# Patient Record
Sex: FEMALE | Race: WHITE | NOT HISPANIC OR LATINO | URBAN - METROPOLITAN AREA
[De-identification: names, ages, dates, MRNs, and addresses within clinical notes are randomized per-mention and may not be internally consistent; named-entity substitution may affect disease eponyms.]

---

## 2024-09-03 ENCOUNTER — EMERGENCY (EMERGENCY)
Facility: HOSPITAL | Age: 19
LOS: 1 days | Discharge: HOME | End: 2024-09-03
Attending: EMERGENCY MEDICINE
Payer: COMMERCIAL

## 2024-09-03 VITALS
SYSTOLIC BLOOD PRESSURE: 153 MMHG | WEIGHT: 100.97 LBS | TEMPERATURE: 98 F | RESPIRATION RATE: 18 BRPM | HEART RATE: 78 BPM | OXYGEN SATURATION: 100 % | DIASTOLIC BLOOD PRESSURE: 97 MMHG | HEIGHT: 67 IN

## 2024-09-03 VITALS
OXYGEN SATURATION: 98 % | SYSTOLIC BLOOD PRESSURE: 132 MMHG | RESPIRATION RATE: 18 BRPM | TEMPERATURE: 98 F | HEART RATE: 78 BPM | DIASTOLIC BLOOD PRESSURE: 80 MMHG

## 2024-09-03 LAB
ANISOCYTOSIS BLD QL: SLIGHT — SIGNIFICANT CHANGE UP
BASOPHILS # BLD AUTO: 0 K/UL — SIGNIFICANT CHANGE UP (ref 0–0.2)
BASOPHILS NFR BLD AUTO: 0 % — SIGNIFICANT CHANGE UP (ref 0–2)
EOSINOPHIL # BLD AUTO: 0 K/UL — SIGNIFICANT CHANGE UP (ref 0–0.5)
EOSINOPHIL NFR BLD AUTO: 0 % — SIGNIFICANT CHANGE UP (ref 0–6)
FERRITIN SERPL-MCNC: 5 NG/ML — LOW (ref 15–150)
GIANT PLATELETS BLD QL SMEAR: PRESENT — SIGNIFICANT CHANGE UP
HCG SERPL-ACNC: <4 MIU/ML — SIGNIFICANT CHANGE UP
HCT VFR BLD CALC: 29.9 % — LOW (ref 34.5–45)
HGB BLD-MCNC: 8.5 G/DL — LOW (ref 11.5–15.5)
HIV 1 & 2 AB SERPL IA.RAPID: SIGNIFICANT CHANGE UP
IRON SATN MFR SERPL: 14 UG/DL — LOW (ref 37–145)
IRON SATN MFR SERPL: 3 % — LOW (ref 14–50)
LYMPHOCYTES # BLD AUTO: 1.78 K/UL — SIGNIFICANT CHANGE UP (ref 1–3.3)
LYMPHOCYTES # BLD AUTO: 33.3 % — SIGNIFICANT CHANGE UP (ref 13–44)
MANUAL SMEAR VERIFICATION: SIGNIFICANT CHANGE UP
MCHC RBC-ENTMCNC: 20.5 PG — LOW (ref 27–34)
MCHC RBC-ENTMCNC: 28.4 GM/DL — LOW (ref 32–36)
MCV RBC AUTO: 72 FL — LOW (ref 80–100)
MICROCYTES BLD QL: SLIGHT — SIGNIFICANT CHANGE UP
MONOCYTES # BLD AUTO: 0.37 K/UL — SIGNIFICANT CHANGE UP (ref 0–0.9)
MONOCYTES NFR BLD AUTO: 7 % — SIGNIFICANT CHANGE UP (ref 2–14)
NEUTROPHILS # BLD AUTO: 3.19 K/UL — SIGNIFICANT CHANGE UP (ref 1.8–7.4)
NEUTROPHILS NFR BLD AUTO: 59.7 % — SIGNIFICANT CHANGE UP (ref 43–77)
PLAT MORPH BLD: NORMAL — SIGNIFICANT CHANGE UP
PLATELET # BLD AUTO: 398 K/UL — SIGNIFICANT CHANGE UP (ref 150–400)
POIKILOCYTOSIS BLD QL AUTO: SLIGHT — SIGNIFICANT CHANGE UP
POLYCHROMASIA BLD QL SMEAR: SLIGHT — SIGNIFICANT CHANGE UP
RBC # BLD: 4.15 M/UL — SIGNIFICANT CHANGE UP (ref 3.8–5.2)
RBC # FLD: 22.5 % — HIGH (ref 10.3–14.5)
RBC BLD AUTO: ABNORMAL
TIBC SERPL-MCNC: 495 UG/DL — HIGH (ref 220–430)
TRANSFERRIN SERPL-MCNC: 346 MG/DL — SIGNIFICANT CHANGE UP (ref 192–382)
WBC # BLD: 5.34 K/UL — SIGNIFICANT CHANGE UP (ref 3.8–10.5)
WBC # FLD AUTO: 5.34 K/UL — SIGNIFICANT CHANGE UP (ref 3.8–10.5)

## 2024-09-03 PROCEDURE — 83540 ASSAY OF IRON: CPT

## 2024-09-03 PROCEDURE — 36415 COLL VENOUS BLD VENIPUNCTURE: CPT

## 2024-09-03 PROCEDURE — 86703 HIV-1/HIV-2 1 RESULT ANTBDY: CPT

## 2024-09-03 PROCEDURE — 84466 ASSAY OF TRANSFERRIN: CPT

## 2024-09-03 PROCEDURE — 82728 ASSAY OF FERRITIN: CPT

## 2024-09-03 PROCEDURE — 86803 HEPATITIS C AB TEST: CPT

## 2024-09-03 PROCEDURE — 96374 THER/PROPH/DIAG INJ IV PUSH: CPT

## 2024-09-03 PROCEDURE — 84702 CHORIONIC GONADOTROPIN TEST: CPT

## 2024-09-03 PROCEDURE — 99284 EMERGENCY DEPT VISIT MOD MDM: CPT

## 2024-09-03 PROCEDURE — 99284 EMERGENCY DEPT VISIT MOD MDM: CPT | Mod: 25

## 2024-09-03 PROCEDURE — 83550 IRON BINDING TEST: CPT

## 2024-09-03 PROCEDURE — 80053 COMPREHEN METABOLIC PANEL: CPT

## 2024-09-03 PROCEDURE — 85025 COMPLETE CBC W/AUTO DIFF WBC: CPT

## 2024-09-03 RX ORDER — IRON SUCROSE 20 MG/ML
100 INJECTION, SOLUTION INTRAVENOUS ONCE
Refills: 0 | Status: COMPLETED | OUTPATIENT
Start: 2024-09-03 | End: 2024-09-03

## 2024-09-03 RX ADMIN — IRON SUCROSE 100 MILLIGRAM(S): 20 INJECTION, SOLUTION INTRAVENOUS at 20:38

## 2024-09-03 NOTE — ED PROVIDER NOTE - NSTOBACCO TYPE_GEN_A_CORE_RD
Oxybutynin Counseling:  I discussed with the patient the risks of oxybutynin including but not limited to skin rash, drowsiness, dry mouth, difficulty urinating, and blurred vision. Vaping/e-Cigarettes

## 2024-09-03 NOTE — ED PROVIDER NOTE - NSFOLLOWUPINSTRUCTIONS_ED_ALL_ED_FT
- Please continue to follow up as outpatient with your hematologist.   - Please bring all documentation from your ED visit to any related future follow up appointment.  - Please call to schedule follow up appointment with your primary care physician within 24-48 hours.  - Please seek immediate medical attention or return to the ED for any new/worsening, signs/symptoms, or concerns.    Feel better!       Anemia       Anemia is a condition in which there is not enough red blood cells or hemoglobin in the blood. Hemoglobin is a substance in red blood cells that carries oxygen.    When you do not have enough red blood cells or hemoglobin (are anemic), your body cannot get enough oxygen and your organs may not work properly. As a result, you may feel very tired or have other problems.      What are the causes?    Common causes of anemia include:  •Excessive bleeding. Anemia can be caused by excessive bleeding inside or outside the body, including bleeding from the intestines or from heavy menstrual periods in females.      •Poor nutrition.      •Long-lasting (chronic) kidney, thyroid, and liver disease.      •Bone marrow disorders, spleen problems, and blood disorders.      •Cancer and treatments for cancer.      •HIV (human immunodeficiency virus) and AIDS (acquired immunodeficiency syndrome).      •Infections, medicines, and autoimmune disorders that destroy red blood cells.        What are the signs or symptoms?    Symptoms of this condition include:  •Minor weakness.      •Dizziness.      •Headache, or difficulties concentrating and sleeping.      •Heartbeats that feel irregular or faster than normal (palpitations).      •Shortness of breath, especially with exercise.      •Pale skin, lips, and nails, or cold hands and feet.      •Indigestion and nausea.      Symptoms may occur suddenly or develop slowly. If your anemia is mild, you may not have symptoms.      How is this diagnosed?    This condition is diagnosed based on blood tests, your medical history, and a physical exam. In some cases, a test may be needed in which cells are removed from the soft tissue inside of a bone and looked at under a microscope (bone marrow biopsy). Your health care provider may also check your stool (feces) for blood and may do additional testing to look for the cause of your bleeding.    Other tests may include:  •Imaging tests, such as a CT scan or MRI.      •A procedure to see inside your esophagus and stomach (endoscopy).      •A procedure to see inside your colon and rectum (colonoscopy).        How is this treated?    Treatment for this condition depends on the cause. If you continue to lose a lot of blood, you may need to be treated at a hospital. Treatment may include:  •Taking supplements of iron, vitamin B12, or folic acid.      •Taking a hormone medicine (erythropoietin) that can help to stimulate red blood cell growth.      •Having a blood transfusion. This may be needed if you lose a lot of blood.      •Making changes to your diet.      •Having surgery to remove your spleen.        Follow these instructions at home:    •Take over-the-counter and prescription medicines only as told by your health care provider.      •Take supplements only as told by your health care provider.      •Follow any diet instructions that you were given by your health care provider.      •Keep all follow-up visits as told by your health care provider. This is important.        Contact a health care provider if:    •You develop new bleeding anywhere in the body.        Get help right away if:    •You are very weak.      •You are short of breath.      •You have pain in your abdomen or chest.      •You are dizzy or feel faint.      •You have trouble concentrating.      •You have bloody stools, black stools, or tarry stools.      •You vomit repeatedly or you vomit up blood.      These symptoms may represent a serious problem that is an emergency. Do not wait to see if the symptoms will go away. Get medical help right away. Call your local emergency services (911 in the [place] Do not drive yourself to the hospital.       Summary    •Anemia is a condition in which you do not have enough red blood cells or enough of a substance in your red blood cells that carries oxygen (hemoglobin).      •Symptoms may occur suddenly or develop slowly.      •If your anemia is mild, you may not have symptoms.      •This condition is diagnosed with blood tests, a medical history, and a physical exam. Other tests may be needed.      •Treatment for this condition depends on the cause of the anemia.      This information is not intended to replace advice given to you by your health care provider. Make sure you discuss any questions you have with your health care provider.

## 2024-09-03 NOTE — ED ADULT NURSE NOTE - NSFALLUNIVINTERV_ED_ALL_ED
Bed/Stretcher in lowest position, wheels locked, appropriate side rails in place/Call bell, personal items and telephone in reach/Instruct patient to call for assistance before getting out of bed/chair/stretcher/Non-slip footwear applied when patient is off stretcher/[place] to call system/Physically safe environment - no spills, clutter or unnecessary equipment/Purposeful proactive rounding/Room/bathroom lighting operational, light cord in reach

## 2024-09-03 NOTE — ED ADULT NURSE NOTE - OBJECTIVE STATEMENT
pt c/o generalized weakness. denies sob. rr even and unlabored. pt educated on plan of care, pt able to successfully teach back plan of care to RN, RN will continue to reeducate pt during hospital stay.

## 2024-09-03 NOTE — ED ADULT TRIAGE NOTE - CHIEF COMPLAINT QUOTE
pt c.o of tired, weakness and fatigue x"weeks" . pt states she was told she had low iron and anemia. pt very anxious in triage. "I feel like Im dying"

## 2024-09-03 NOTE — ED PROVIDER NOTE - PROGRESS NOTE DETAILS
AVRIL Colin: Patient evaluated by intake physician. HPI/ROS/PE as noted above. Will follow up plan per intake physician and continue to assess patient. AVRIL Colin: Results discussed with patient. Baseline hemoglobin 8.5. States she was supposed ot have iron transfusion as outpatient, but was canceled 2/2 insurance. Follows with hematologist. Will give push in ED, monitor and DC. AVRIL Colin: Results discussed with patient. Baseline hemoglobin 8.5. States she was supposed ot have iron transfusion as outpatient, but was canceled 2/2 insurance. Follows with hematologist. Will give in ED, monitor and DC.

## 2024-09-03 NOTE — ED PROVIDER NOTE - PATIENT PORTAL LINK FT
You can access the FollowMyHealth Patient Portal offered by [facility] by registering at the following website: [email or web address]. By joining [facility]’s FollowMyHealth portal, you will also be able to view your health information using other applications (apps) compatible with our system.

## 2024-09-03 NOTE — ED PROVIDER NOTE - ATTENDING APP SHARED VISIT CONTRIBUTION OF CARE
I, Karlo Del Rio MD, performed the initial face to face bedside interview with this patient regarding history of present illness, review of symptoms and relevant past medical, social and family history.  I completed an independent physical examination.  I was the initial provider who evaluated this patient. I have signed out the follow up of any pending tests (i.e. labs, radiological studies) to the ACP.  I have communicated the patient’s plan of care and disposition with the ACP.

## 2024-09-04 LAB
HCV AB S/CO SERPL IA: 0.19 S/CO — SIGNIFICANT CHANGE UP (ref 0–0.99)
HCV AB SERPL-IMP: SIGNIFICANT CHANGE UP

## 2025-01-16 ENCOUNTER — EMERGENCY (EMERGENCY)
Facility: HOSPITAL | Age: 20
LOS: 1 days | Discharge: HOME | End: 2025-01-16
Attending: EMERGENCY MEDICINE
Payer: COMMERCIAL

## 2025-01-16 VITALS
RESPIRATION RATE: 16 BRPM | SYSTOLIC BLOOD PRESSURE: 137 MMHG | DIASTOLIC BLOOD PRESSURE: 92 MMHG | WEIGHT: 115.08 LBS | TEMPERATURE: 100 F | HEART RATE: 107 BPM

## 2025-01-16 PROBLEM — D50.9 IRON DEFICIENCY ANEMIA, UNSPECIFIED: Chronic | Status: ACTIVE | Noted: 2024-09-03

## 2025-01-16 PROCEDURE — 56420 I&D BARTHOLINS GLAND ABSCESS: CPT

## 2025-01-16 PROCEDURE — 87075 CULTR BACTERIA EXCEPT BLOOD: CPT

## 2025-01-16 PROCEDURE — 99284 EMERGENCY DEPT VISIT MOD MDM: CPT

## 2025-01-16 PROCEDURE — 87070 CULTURE OTHR SPECIMN AEROBIC: CPT

## 2025-01-16 PROCEDURE — 99284 EMERGENCY DEPT VISIT MOD MDM: CPT | Mod: 25

## 2025-01-16 PROCEDURE — 87077 CULTURE AEROBIC IDENTIFY: CPT

## 2025-01-16 RX ORDER — LIDOCAINE HYDROCHLORIDE 10 MG/ML
5 INJECTION INFILTRATION; PERINEURAL ONCE
Refills: 0 | Status: ACTIVE | OUTPATIENT
Start: 2025-01-16 | End: 2025-01-16

## 2025-01-16 NOTE — ED PROVIDER NOTE - ATTENDING APP SHARED VISIT CONTRIBUTION OF CARE
Patient seen with ALANNAH BANDA.  Here with abscess/cyst.  Otherwise baseline.  No prior.  Mother also present.  Gyn consulted and drained abscess.  will treat.  Non toxic.  Well appearing. Uneventful ED observation period. Discussed signs and symptoms and reasons for return with good teachback.

## 2025-01-16 NOTE — ED PROVIDER NOTE - NSFOLLOWUPINSTRUCTIONS_ED_ALL_ED_FT
Follow up with GYN.  Take antibiotic as prescribed.  Come back with new or worsening symptoms.  Sitx baths at home.  Come back with new or worsening symptoms.

## 2025-01-16 NOTE — ED PROVIDER NOTE - OBJECTIVE STATEMENT
Pt is a 19y F with no PMH presenting for L sided bartholin cyst. Pt denies ever having this before. Pt noticed it 4 days ago and it grew significantly. She tried hot showers without relief. Pt also took some alleve. She went to urgent care yesterday and was prescribed bactrim and to f/u with GYN. Pt does not have a gyn and made an appt for february 4th. She states pain is too great to wait. She denies any drainage fever or urinary symptoms. Pt is sexually active with one male partner but is declining STI testing.  no other complaints.

## 2025-01-16 NOTE — PROCEDURE NOTE - ADDITIONAL PROCEDURE DETAILS
Skin prepped with betadine, 2% lidocaine injected for local anesthetic, skin prepped again with betadine. Small labial incision made at 4oclock with drainage of purulent fluid. loculations Broken up with etienne clamp and remaining fluid expressed manually. Patient tolerated the procedure well.

## 2025-01-16 NOTE — ED PROVIDER NOTE - PATIENT PORTAL LINK FT
You can access the FollowMyHealth Patient Portal offered by [facility] by registering at the following website: [email or web address]. By joining [facility]’s FollowMyHealth portal, you will also be able to view your health information using other applications (apps) compatible with our system.

## 2025-01-16 NOTE — ED ADULT NURSE NOTE - AVIAN FLU SYMPTOMS
----- Message from Kimberley Santoyo NP sent at 6/12/2020 12:33 PM CDT -----  Please call and inform patient her recent breast imaging results were within normal limits.   No

## 2025-01-16 NOTE — ED PROVIDER NOTE - CLINICAL SUMMARY MEDICAL DECISION MAKING FREE TEXT BOX
Pt is a 19y F with no PMH presenting for L sided bartholin cyst. Pt denies ever having this before. Pt noticed it 4 days ago and it grew significantly. She tried hot showers without relief. Pt also took some alleve. She went to urgent care yesterday and was prescribed bactrim and to f/u with GYN. Pt does not have a gyn and made an appt for february 4th. She states pain is too great to wait. She denies any drainage fever or urinary symptoms. Pt is sexually active with one male partner but is declining STI testing.  no other complaints.  pt with vaginal cyst. GYN called tro drain.  Will dc with keflex.

## 2025-01-16 NOTE — PROCEDURE NOTE - SUPERVISORY STATEMENT
Pt underwent an uncomplicated I+D of bartholin's gland abscess.  Procedure well tolerated with excellent hemostasis noted at the conclusion of the procedure.  Pt to start one week of keflex postprocedure.  Also advised to do sitz baths daily  Pt is scheduled to see gyn in early Feb.  Advised pt to call to move up her appointment.

## 2025-01-16 NOTE — CONSULT NOTE ADULT - ASSESSMENT
A/P: 19y G0 who presents to ED for with complaints of vulvar pain x3 days, found to have Bartholin gland abscess.     Plan: I&D with keflex x7 days, follow up with gyn provider in 1 week     D/w Dr. Wan

## 2025-01-16 NOTE — CONSULT NOTE ADULT - SUBJECTIVE AND OBJECTIVE BOX
EDUARD CARTER is a 19y G0 who presents to ED for with complaints of vulvar pain. GYN was consulted for concern for Bartholin abscess. Patient reports pain and L labial swelling started 3-4 days ago and has become larger and increasingly painful over time. She was seen at an urgent care facility yesterday and was given an antibiotic but came to the ED due to unbearable pain. States she can't sit or walk d/t pain. She denies any recent shaving to the area. Denies any drainage of fluid from the area. She denies N/V/F/C. Reports a decreased appetite which she attributes to the pain. Denies additional complaints.     OB: G0  Gyn: denies hx fibroids/cysts, denies hx sti/std  PMH: denies   PSH: denies   Med: denies   All: NKDA     Vitals:   T(C): 37.7 (01-16-25 @ 14:49), Max: 37.7 (01-16-25 @ 14:49)  HR: 107 (01-16-25 @ 14:49) (107 - 107)  BP: 137/92 (01-16-25 @ 14:49) (137/92 - 137/92)  RR: 16 (01-16-25 @ 14:49) (16 - 16)  SpO2: --    General: AAOx3, NAD  Abd: Soft, nontender, non distended; no rebound/guarding  Genital: L     Labs:              Radiology:      A/P:     D/w** EDUARD CARTER is a 19y G0 who presents to ED for with complaints of vulvar pain. GYN was consulted for concern for Bartholin abscess. Patient reports pain and L labial swelling started 3-4 days ago and has become larger and increasingly painful over time. She was seen at an urgent care facility yesterday and was given an antibiotic but came to the ED due to unbearable pain. States she can't sit or walk d/t pain. She denies any recent shaving to the area. Denies any drainage of fluid from the area. She denies N/V/F/C. Reports a decreased appetite which she attributes to the pain. Denies additional complaints.     Outpatient GYN: scheduled to establish care for 2/4/25, unsure provider name     OB: G0  Gyn: denies hx fibroids/cysts, denies hx sti/std  PMH: denies   PSH: denies   Med: denies   All: NKDA     Vitals:   T(C): 37.7 (01-16-25 @ 14:49), Max: 37.7 (01-16-25 @ 14:49)  HR: 107 (01-16-25 @ 14:49) (107 - 107)  BP: 137/92 (01-16-25 @ 14:49) (137/92 - 137/92)  RR: 16 (01-16-25 @ 14:49) (16 - 16)  SpO2: --    General: AAOx3, NAD  Abd: Soft, nontender, non distended; no rebound/guarding  Genital: L labial abscess @ 4oclock, erythematous, fluctuant, warm/tender to touch

## 2025-01-16 NOTE — CONSULT NOTE ADULT - ATTENDING COMMENTS
Agree with above assessment and plan.  Pt is a  19y G0 who presents to ED for with complaints of vulvar pain x3 days, found to have Bartholin gland abscess.  Recommend incision and drainage of Bartholin's gland abscess.  Pt is agreeable to recommendation.  Will proceed to do I+D as above  Please see procedure note for details.    ROBERT Reese

## 2025-01-17 LAB
GRAM STN FLD: SIGNIFICANT CHANGE UP
SPECIMEN SOURCE: SIGNIFICANT CHANGE UP

## 2025-01-19 LAB
CULTURE RESULTS: ABNORMAL
GRAM STN FLD: ABNORMAL
SPECIMEN SOURCE: SIGNIFICANT CHANGE UP

## 2025-01-20 LAB — CULTURE RESULTS: ABNORMAL

## 2025-01-21 ENCOUNTER — EMERGENCY (EMERGENCY)
Facility: HOSPITAL | Age: 20
LOS: 1 days | Discharge: HOME | End: 2025-01-21
Attending: STUDENT IN AN ORGANIZED HEALTH CARE EDUCATION/TRAINING PROGRAM
Payer: COMMERCIAL

## 2025-01-21 VITALS
OXYGEN SATURATION: 99 % | SYSTOLIC BLOOD PRESSURE: 124 MMHG | DIASTOLIC BLOOD PRESSURE: 83 MMHG | TEMPERATURE: 99 F | HEIGHT: 63 IN | HEART RATE: 104 BPM | RESPIRATION RATE: 16 BRPM | WEIGHT: 102.29 LBS

## 2025-01-21 PROCEDURE — 99283 EMERGENCY DEPT VISIT LOW MDM: CPT | Mod: 25

## 2025-01-21 PROCEDURE — 99283 EMERGENCY DEPT VISIT LOW MDM: CPT

## 2025-01-21 PROCEDURE — 96372 THER/PROPH/DIAG INJ SC/IM: CPT

## 2025-01-21 RX ORDER — METRONIDAZOLE 250 MG/1
500 TABLET ORAL ONCE
Refills: 0 | Status: COMPLETED | OUTPATIENT
Start: 2025-01-21 | End: 2025-01-21

## 2025-01-21 RX ORDER — CEFTRIAXONE SODIUM 1 G/1
500 INJECTION, POWDER, FOR SOLUTION INTRAMUSCULAR; INTRAVENOUS ONCE
Refills: 0 | Status: COMPLETED | OUTPATIENT
Start: 2025-01-21 | End: 2025-01-21

## 2025-01-21 RX ORDER — METRONIDAZOLE 250 MG/1
1 TABLET ORAL
Qty: 20 | Refills: 0
Start: 2025-01-21 | End: 2025-01-30

## 2025-01-21 RX ADMIN — CEFTRIAXONE SODIUM 500 MILLIGRAM(S): 1 INJECTION, POWDER, FOR SOLUTION INTRAMUSCULAR; INTRAVENOUS at 17:55

## 2025-01-21 RX ADMIN — METRONIDAZOLE 500 MILLIGRAM(S): 250 TABLET ORAL at 17:54

## 2025-01-21 NOTE — ED PROVIDER NOTE - PATIENT PORTAL LINK FT
You can access the FollowMyHealth Patient Portal offered by [facility] by registering at the following website: [email or web address]. By joining [facility]’s FollowMyHealth portal, you will also be able to view your health information using other applications (apps) compatible with our system.

## 2025-01-21 NOTE — ED PROVIDER NOTE - CARE PLAN
Principal Discharge DX:	Neisseria gonorrheae  Secondary Diagnosis:	Gardnerella vaginalis infection   1

## 2025-01-21 NOTE — ED PROVIDER NOTE - OBJECTIVE STATEMENT
20yo female with pmh of bartholin abscess s/p I&D 1/16 presents for STI treatment. Pt states she was called to come back get treatment. Pt denies fevers/chills, ha, loc, focal neuro deficits, cp/sob/palp, cough, abd pain/n/v/d, urinary symptoms, recent travel, vaginal discharge. Pt is sexually active with one male partner.

## 2025-01-21 NOTE — ED PROVIDER NOTE - NSFOLLOWUPINSTRUCTIONS_ED_ALL_ED_FT
Bacterial Vaginosis       Bacterial vaginosis is an infection that occurs when the normal balance of bacteria in the vagina changes. This change is caused by an overgrowth of certain bacteria in the vagina. Bacterial vaginosis is the most common vaginal infection among females ages 15–44.    This condition increases the risk of sexually transmitted infections (STIs). Treatment can help reduce this risk. Treatment is very important for pregnant women because this condition can cause babies to be born early (prematurely) or at a low birth weight.    What are the causes?  This condition is caused by an increase in harmful bacteria that is normally present in small amounts in the vagina. However, the exact reason this condition develops is not known.    You cannot get bacterial vaginosis from toilet seats, bedding, swimming pools, or contact with objects around you.    What increases the risk?  The following factors may make you more likely to develop this condition:    Having a new sexual partner or multiple sexual partners.  Having unprotected sex.  Douching.  Having an intrauterine device (IUD).  Smoking.  Abusing drugs and alcohol. This may lead to riskier sexual behavior.  Taking certain antibiotic medicines.  Being pregnant.    What are the signs or symptoms?  Some women with this condition have no symptoms. Symptoms may include:    Gray or white vaginal discharge. The discharge can be watery or foamy.  A fish-like odor with discharge, especially after sexual intercourse or during menstruation.  Itching in and around the vagina.  Burning or pain with urination.    How is this diagnosed?  This condition is diagnosed based on:    Your medical history.  A physical exam of the vagina.  Checking a sample of vaginal fluid for harmful bacteria or abnormal cells.    How is this treated?  This condition is treated with antibiotic medicines. These may be given as a pill, a vaginal cream, or a medicine that is put into the vagina (suppository). If the condition comes back after treatment, a second round of antibiotics may be needed.    Follow these instructions at home:      Medicines    Take or apply over-the-counter and prescription medicines only as told by your health care provider.  Take or apply your antibiotic medicine as told by your health care provider. Do not stop using the antibiotic even if you start to feel better.        General instructions    If you have a female sexual partner, tell her that you have a vaginal infection. She should follow up with her health care provider. If you have a male sexual partner, he does not need treatment.  Avoid sexual activity until you finish treatment.  Drink enough fluid to keep your urine pale yellow.  Keep the area around your vagina and rectum clean.    Wash the area daily with warm water.  Wipe yourself from front to back after using the toilet.  If you are breastfeeding, talk to your health care provider about continuing breastfeeding during treatment.  Keep all follow-up visits as told by your health care provider. This is important.    How is this prevented?      Self-care    Do not douche.  Wash the outside of your vagina with warm water only.  Wear cotton or cotton-lined underwear.  Avoid wearing tight pants and pantyhose, especially during the summer.        Safe sex    Use protection when having sex. This includes:    Latex condoms.  Dental dams. This is a layer of latex that protects the mouth during oral sex.  Limit the number of sexual partners. To help prevent bacterial vaginosis, it is best to have sex with just one partner (monogamous relationship).  Make sure you and your sexual partner are tested for STIs.        Drugs and alcohol    Do not use any products that contain nicotine or tobacco, such as cigarettes, e-cigarettes, and chewing tobacco. If you need help quitting, ask your health care provider.  Do not use illegal drugs.  Do not drink alcohol if:    Your health care provider tells you not to do this.  You are pregnant, may be pregnant, or are planning to become pregnant.  If you drink alcohol:    Limit how much you have to 0–1 drink a day.   Be aware of how much alcohol is in your drink. In the [place] one drink equals one 12 oz bottle of beer (355 mL), one 5 oz glass of wine (148 mL), or one 1½ oz glass of hard liquor (44 mL).    Where to find more information  Centers for Disease Control and Prevention: www.cdc.gov  American Sexual Health Association (DANYA): www.ashastd.org  [place] Department of Health and Human Services, Office on Women's Health: www.womenshealth.gov    Contact a health care provider if:  Your symptoms do not improve, even after treatment.  You have more discharge or pain when urinating.  You have a fever.  You have pain in your abdomen.  You have pain during sex.  You have vaginal bleeding between periods.    Summary  Bacterial vaginosis is a vaginal infection that occurs when the normal balance of bacteria in the vagina changes. It results from an overgrowth of certain bacteria.  This condition increases the risk of STIs (sexually transmitted infections). Getting treated can help reduce this risk.  Treatment is very important for pregnant women because this condition can cause babies to be born early (prematurely) or at low birth weight.  This condition is treated with antibiotic medicines. These may be given as a pill, a vaginal cream, or a medicine that is put into the vagina (suppository).        Gonorrhea    Gonorrhea is an STI (sexually transmitted infection) that can infect both men and women. If left untreated, this infection can:    Damage the female or male reproductive organs.  Cause women and men to be unable to have children (infertility).  Harm an unborn baby if an infected woman is pregnant.    It is important to get treatment for gonorrhea as soon as possible. All of your sex partners may also need to be treated for the infection.    What are the causes?  This condition is caused by bacteria called Neisseria gonorrhoeae. The infection is spread from person to person through sexual contact, including oral, anal, and vaginal sex. A  can get the infection from his or her mother during birth.    What increases the risk?  The following factors may make you more likely to develop this condition:    Being a woman who is younger than age 25 and who is sexually active.  Being a man who is bell or bisexual and who has sex with men.  Having a new sex partner or having multiple partners.  Having a sex partner who has an STI.  Not using condoms correctly or not using condoms every time you have sex.  Having a history of STIs.  Exchanging sex for money or drugs.    What are the signs or symptoms?  When symptoms occur, they may be different for women and men. Symptoms may include:    Abnormal discharge from the penis or vagina. The discharge may be cloudy, thick, or yellow-green in color.  Pain or burning when you urinate.  Irritation, pain, bleeding, or discharge from the rectum. This may occur if the infection was spread by anal sex.  Sore throat or swollen lymph nodes in the neck. This may occur if the infection was spread by oral sex.  Pain or swelling in the testicles, in men.  Bleeding between menstrual periods, in women.    In some cases, there are no symptoms.    How is this diagnosed?  This condition is diagnosed based on:    A physical exam.  A sample of discharge that is looked at under a microscope to find any bacteria. The discharge may be taken from the penis, vagina, throat, or rectum.  Urine tests.    Not all test results will be available during your visit.    How is this treated?  This condition is treated with antibiotic medicines. It is important to start treatment as soon as possible. Early treatment may prevent some problems from developing. You should not have sex during treatment. All types of sexual activity should be avoided for at least 7 days after treatment is complete and until your sex partner or partners have been treated.    Follow these instructions at home:  Take over-the-counter and prescription medicines as told by your health care provider. Finish all antibiotic medicine even when you start to feel better.  Do not have sex during treatment.  Do not have sex until at least 7 days after you and your partner or partners have finished treatment and your health care provider says it is okay.  It is up to you to get your test results. Ask your health care provider, or the department that is doing the test, when your results will be ready.  If you get a positive result on your gonorrhea test, tell your recent sex partners. These include any partners for oral, anal, or vaginal sex. They need to be checked for gonorrhea even if they do not have symptoms. They may need treatment, even if they get negative results on their gonorrhea tests.  Drink enough fluid to keep your urine pale yellow.  Keep all follow-up visits as told by your health care provider. This is important.    How is this prevented?     Use latex condoms correctly every time you have sex.  Ask if your sex partner or partners have been tested for STIs and had negative results.  Avoid having multiple sex partners.  Get regular health screenings to check for STIs.    Contact a health care provider if:  Your symptoms do not get better after a few days of taking antibiotics.  Your symptoms get worse.  You develop new symptoms, including:    Eye irritation.  Joint pain or swelling.  Unusual rashes.  You have a fever.    Summary  Gonorrhea is an STI (sexually transmitted infection) that can infect both men and women.  This infection is spread from person to person through sexual contact, including oral, anal, and vaginal sex. A  can get the infection from his or her mother during birth.  Symptoms include abnormal discharge, pain or burning while urinating, or pain in the rectum.  This condition is treated with antibiotic medicines. Do not have sex until at least 7 days after both you and any sex partners have completed antibiotic treatment.  Tell your health care provider if your symptoms get worse or you have new symptoms.    ADDITIONAL NOTES AND INSTRUCTIONS    Please follow up with your Primary MD in 24-48 hr.  Seek immediate medical care for any new/worsening signs or symptoms.

## 2025-01-21 NOTE — ED PROVIDER NOTE - CLINICAL SUMMARY MEDICAL DECISION MAKING FREE TEXT BOX
patient presents after being called back for a positive culture for STI treatment patient denies any vaginal discharge or any symptoms at this time culture grew Neisseria gonorrhea as well as Gardnerella vaginalis and Prevotella timonensis Peptostreptococcus anaerobius   Will treat for uncomplicated gonorrhea as well as bacterial vaginosis patient instructed to follow-up with gynecology and PMD given strict return precautions and follow-up